# Patient Record
Sex: FEMALE | Race: AMERICAN INDIAN OR ALASKA NATIVE
[De-identification: names, ages, dates, MRNs, and addresses within clinical notes are randomized per-mention and may not be internally consistent; named-entity substitution may affect disease eponyms.]

---

## 2020-12-02 ENCOUNTER — HOSPITAL ENCOUNTER (EMERGENCY)
Dept: HOSPITAL 5 - ED | Age: 55
Discharge: HOME | End: 2020-12-02
Payer: SELF-PAY

## 2020-12-02 VITALS — DIASTOLIC BLOOD PRESSURE: 60 MMHG | SYSTOLIC BLOOD PRESSURE: 120 MMHG

## 2020-12-02 DIAGNOSIS — Z20.828: ICD-10-CM

## 2020-12-02 DIAGNOSIS — Z88.0: ICD-10-CM

## 2020-12-02 DIAGNOSIS — M17.0: ICD-10-CM

## 2020-12-02 DIAGNOSIS — Z79.899: ICD-10-CM

## 2020-12-02 DIAGNOSIS — J32.9: Primary | ICD-10-CM

## 2020-12-02 LAB
ALBUMIN SERPL-MCNC: 3.8 G/DL (ref 3.9–5)
ALT SERPL-CCNC: 19 UNITS/L (ref 7–56)
BACTERIA #/AREA URNS HPF: (no result) /HPF
BASOPHILS # (AUTO): 0.1 K/MM3 (ref 0–0.1)
BASOPHILS NFR BLD AUTO: 0.8 % (ref 0–1.8)
BILIRUB UR QL STRIP: (no result)
BLOOD UR QL VISUAL: (no result)
BUN SERPL-MCNC: 13 MG/DL (ref 7–17)
BUN/CREAT SERPL: 13 %
CALCIUM SERPL-MCNC: 9.4 MG/DL (ref 8.4–10.2)
EOSINOPHIL # BLD AUTO: 0 K/MM3 (ref 0–0.4)
EOSINOPHIL NFR BLD AUTO: 0.1 % (ref 0–4.3)
HCT VFR BLD CALC: 39.5 % (ref 30.3–42.9)
HEMOLYSIS INDEX: 49
HGB BLD-MCNC: 13.7 GM/DL (ref 10.1–14.3)
LYMPHOCYTES # BLD AUTO: 1.3 K/MM3 (ref 1.2–5.4)
LYMPHOCYTES NFR BLD AUTO: 16.4 % (ref 13.4–35)
MCHC RBC AUTO-ENTMCNC: 35 % (ref 30–34)
MCV RBC AUTO: 86 FL (ref 79–97)
MONOCYTES # (AUTO): 0.6 K/MM3 (ref 0–0.8)
MONOCYTES % (AUTO): 8.3 % (ref 0–7.3)
MUCOUS THREADS #/AREA URNS HPF: (no result) /HPF
PH UR STRIP: 5 [PH] (ref 5–7)
PLATELET # BLD: 196 K/MM3 (ref 140–440)
RBC # BLD AUTO: 4.59 M/MM3 (ref 3.65–5.03)
RBC #/AREA URNS HPF: 28 /HPF (ref 0–6)
UROBILINOGEN UR-MCNC: < 2 MG/DL (ref ?–2)
WBC #/AREA URNS HPF: 13 /HPF (ref 0–6)

## 2020-12-02 PROCEDURE — 87086 URINE CULTURE/COLONY COUNT: CPT

## 2020-12-02 PROCEDURE — 96374 THER/PROPH/DIAG INJ IV PUSH: CPT

## 2020-12-02 PROCEDURE — 99284 EMERGENCY DEPT VISIT MOD MDM: CPT

## 2020-12-02 PROCEDURE — 85025 COMPLETE CBC W/AUTO DIFF WBC: CPT

## 2020-12-02 PROCEDURE — 96361 HYDRATE IV INFUSION ADD-ON: CPT

## 2020-12-02 PROCEDURE — 36415 COLL VENOUS BLD VENIPUNCTURE: CPT

## 2020-12-02 PROCEDURE — 74022 RADEX COMPL AQT ABD SERIES: CPT

## 2020-12-02 PROCEDURE — 81001 URINALYSIS AUTO W/SCOPE: CPT

## 2020-12-02 PROCEDURE — 80053 COMPREHEN METABOLIC PANEL: CPT

## 2020-12-02 NOTE — EMERGENCY DEPARTMENT REPORT
- General


Chief Complaint: Nausea/Vomiting/Diarrhea


Stated Complaint: DRY COUGH, HEADACHE, CHILLS


Time Seen by Provider: 20 10:20


Source: patient


Mode of arrival: Ambulatory


Limitations: No Limitations





- History of Present Illness


Initial Comments: 





This is a 54-year-old female nontoxic, well nourished in appearance, no acute 

signs of distress presents to the ED with c/o of nonproductive cough,nausea, 

sinus headcahes, body aches, rhinorrhea, nasal congestion x several days.  

Patient denies any sick contacts.  Patient denies any recent travels, long car, 

recent hospital stays.  Patient denies any calf pain or calf tenderness.  

Patient denies any chest pain, short of breath, fever, chills, vomiting, 

hemoptysis, numbness, tingling, headache or stiff neck.  Allergies includes PCN.


MD Complaint: cough, rhinorrhea, nasal congestion, sinus pain, other (nasuea)


-: days(s)


Severity: mild


Severity scale (0 -10): 8


Quality: aching


Consistency: constant


Improves With: nothing


Worsens With: nothing


Associated Symptoms: rhinorrhea, nasal congestion, cough, nausea.  denies: 

fever, chills, myalgias, diaphoresis, headache, sore throat, stiff neck, chest 

pain, shortness of breath, abdominal pain, vomiting, diarrhea, dysuria, rash, 

confusion, right sweats, weight loss, epistaxis, hoarseness, ear pain


Treatments Prior to Arrival: none





- Related Data


                                  Previous Rx's











 Medication  Instructions  Recorded  Last Taken  Type


 


Aspirin [Aspirin TAB] 325 mg PO QDAY #30 tablet 16 1 Week Ago Rx





   ~10/18/16 


 


oxyCODONE /ACETAMINOPHEN [Percocet 1 tab PO Q4H PRN #60 tablet 16 1 Week 

Ago Rx





5/325 mg]   ~10/18/16 


 


Azithromycin [Zithromax Z-MALKA] 250 mg PO DAILY #6 tablet 20 Unknown Rx











                                    Allergies











Allergy/AdvReac Type Severity Reaction Status Date / Time


 


Penicillins AdvReac  Hives Verified 06/09/15 10:42














ED Review of Systems


ROS: 


Stated complaint: DRY COUGH, HEADACHE, CHILLS


Other details as noted in HPI





Constitutional: denies: chills, fever


Eyes: denies: eye pain, eye discharge, vision change


ENT: congestion.  denies: ear pain, throat pain


Respiratory: cough.  denies: shortness of breath, wheezing


Cardiovascular: denies: chest pain, palpitations


Endocrine: no symptoms reported


Gastrointestinal: nausea.  denies: abdominal pain, diarrhea


Genitourinary: denies: urgency, dysuria, discharge


Musculoskeletal: denies: back pain, joint swelling, arthralgia


Skin: denies: rash, lesions


Neurological: denies: headache, weakness, paresthesias


Psychiatric: denies: anxiety, depression


Hematological/Lymphatic: denies: easy bleeding, easy bruising





ED Past Medical Hx





- Past Medical History


Hx Hypertension: No


Hx Congestive Heart Failure: No


Hx Diabetes: No


Hx Deep Vein Thrombosis: No


Hx Arthritis: Yes (knees)


Hx Asthma: No


Hx COPD: No





- Surgical History


Hx Pacemaker: No


Hx Internal Defibrillator: No





- Social History


Smoking Status: Never Smoker


Substance Use Type: Alcohol





- Medications


Home Medications: 


                                Home Medications











 Medication  Instructions  Recorded  Confirmed  Last Taken  Type


 


Aspirin [Aspirin TAB] 325 mg PO QDAY #30 tablet 09/29/16 10/19/16 1 Week Ago Rx





    ~10/18/16 


 


oxyCODONE /ACETAMINOPHEN [Percocet 1 tab PO Q4H PRN #60 tablet 09/29/16 10/19/16

 1 Week Ago Rx





5/325 mg]    ~10/18/16 


 


Azithromycin [Zithromax Z-MALKA] 250 mg PO DAILY #6 tablet 20  Unknown Rx














ED Physical Exam





- General


Limitations: No Limitations


General appearance: alert, in no apparent distress





- Head


Head exam: Present: atraumatic, normocephalic





- Eye


Eye exam: Present: normal appearance





- Neck


Neck exam: Present: normal inspection, full ROM.  Absent: tenderness, m

eningismus, lymphadenopathy





- Respiratory


Respiratory exam: Present: normal lung sounds bilaterally.  Absent: respiratory 

distress, wheezes, rales, rhonchi, stridor, chest wall tenderness, accessory 

muscle use, decreased breath sounds, prolonged expiratory





- Cardiovascular


Cardiovascular Exam: Present: normal rhythm, tachycardia, normal heart sounds.  

Absent: irregular rhythm





- GI/Abdominal


GI/Abdominal exam: Present: soft, normal bowel sounds.  Absent: distended, 

tenderness, guarding, rebound, rigid, diminished bowel sounds





- Extremities Exam


Extremities exam: Present: normal inspection, full ROM





- Back Exam


Back exam: Present: normal inspection, full ROM.  Absent: tenderness, CVA 

tenderness (R), CVA tenderness (L), muscle spasm, paraspinal tenderness, 

vertebral tenderness, rash noted





- Neurological Exam


Neurological exam: Present: alert, oriented X3, normal gait





- Psychiatric


Psychiatric exam: Present: normal affect, normal mood





- Skin


Skin exam: Present: warm, dry, intact, normal color.  Absent: rash





- Other


Other exam information: 





Positive frontal sinus tenderness





ED Course


                                   Vital Signs











  20





  08:31 11:12 11:51


 


Temperature 98.8 F  


 


Pulse Rate 114 H  86


 


Respiratory 20 20 16





Rate   


 


Blood Pressure 116/55  


 


Blood Pressure   120/60





[Left]   


 


O2 Sat by Pulse 97  99





Oximetry   














- Reevaluation(s)


Reevaluation #1: 





20 11:34


Patient is speaking in full sentences with no signs of distress noted.





ED Medical Decision Making





- Lab Data


Result diagrams: 


                                 20 09:31





                                 20 09:31








                                   Lab Results











  20 Range/Units





  08:47 09:31 09:31 


 


WBC   7.8   (4.5-11.0)  K/mm3


 


RBC   4.59   (3.65-5.03)  M/mm3


 


Hgb   13.7   (10.1-14.3)  gm/dl


 


Hct   39.5   (30.3-42.9)  %


 


MCV   86   (79-97)  fl


 


MCH   30   (28-32)  pg


 


MCHC   35 H   (30-34)  %


 


RDW   15.1   (13.2-15.2)  %


 


Plt Count   196   (140-440)  K/mm3


 


Lymph % (Auto)   16.4   (13.4-35.0)  %


 


Mono % (Auto)   8.3 H   (0.0-7.3)  %


 


Eos % (Auto)   0.1   (0.0-4.3)  %


 


Baso % (Auto)   0.8   (0.0-1.8)  %


 


Lymph # (Auto)   1.3   (1.2-5.4)  K/mm3


 


Mono # (Auto)   0.6   (0.0-0.8)  K/mm3


 


Eos # (Auto)   0.0   (0.0-0.4)  K/mm3


 


Baso # (Auto)   0.1   (0.0-0.1)  K/mm3


 


Seg Neutrophils %   74.4 H   (40.0-70.0)  %


 


Seg Neutrophils #   5.8   (1.8-7.7)  K/mm3


 


Sodium    133 L  (137-145)  mmol/L


 


Potassium    4.6  (3.6-5.0)  mmol/L


 


Chloride    98.1  ()  mmol/L


 


Carbon Dioxide    26  (22-30)  mmol/L


 


Anion Gap    14  mmol/L


 


BUN    13  (7-17)  mg/dL


 


Creatinine    1.0  (0.6-1.2)  mg/dL


 


Estimated GFR    > 60  ml/min


 


BUN/Creatinine Ratio    13  %


 


Glucose    117 H  ()  mg/dL


 


Calcium    9.4  (8.4-10.2)  mg/dL


 


Total Bilirubin    0.30  (0.1-1.2)  mg/dL


 


AST    24  (5-40)  units/L


 


ALT    19  (7-56)  units/L


 


Alkaline Phosphatase    54  ()  units/L


 


Total Protein    7.7  (6.3-8.2)  g/dL


 


Albumin    3.8 L  (3.9-5)  g/dL


 


Albumin/Globulin Ratio    1.0  %


 


Urine Color  Yellow    (Yellow)  


 


Urine Turbidity  Slightly-cloudy    (Clear)  


 


Urine pH  5.0    (5.0-7.0)  


 


Ur Specific Gravity  1.017    (1.003-1.030)  


 


Urine Protein  30 mg/dl    (Negative)  mg/dL


 


Urine Glucose (UA)  Neg    (Negative)  mg/dL


 


Urine Ketones  Neg    (Negative)  mg/dL


 


Urine Blood  Lg    (Negative)  


 


Urine Nitrite  Neg    (Negative)  


 


Urine Bilirubin  Neg    (Negative)  


 


Urine Urobilinogen  < 2.0    (<2.0)  mg/dL


 


Ur Leukocyte Esterase  Tr    (Negative)  


 


Urine WBC (Auto)  13.0 H    (0.0-6.0)  /HPF


 


Urine RBC (Auto)  28.0    (0.0-6.0)  /HPF


 


U Epithel Cells (Auto)  24.0 H    (0-13.0)  /HPF


 


Urine Bacteria (Auto)  1+    (Negative)  /HPF


 


Urine Mucus  Few    /HPF


 


Urine Yeast (Budding)  Few    /HPF














- Radiology Data











Referring Physician: PHIL TRIPP


 


Patient Name: JOVANA CHAVEZ


 


Patient ID: [id number]


 


YOB: 1965


 


Sex: Female


 


Accession: [id number]


 


Report Date: 2020


 


Report Status: Finalized








[facility] [address] [place] [address] 

XRay Report Signed Patient: JOVANA CHAVEZ MR#: [id number] : 1965

 Acct:[id number] Age/Sex: 54 / F ADM Date: 20 Loc: ED Attending Dr: 

Ordering Physician: PHIL TRIPP NP Date of Service: 20 Procedure(s): XR

 abd series w cxr 1V Accession Number(s): [id number] cc: PHIL TRIPP NP Fluoro 

Time In Minutes: . ABDOMEN 3 VIEW(S) INDICATION / CLINICAL INFORMATION: co

ugh/n/v. COMPARISON: None available. FINDINGS: TUBES / LINES: None. BOWEL GAS 

PATTERN: No significant abnormality. FREE AIR / EXTRALUMINAL GAS: None seen. 

ADDITIONAL FINDINGS: No significant additional findings. LUNGS: Visualized lungs

 show no significant abnormality. IMPRESSION: 1. No significant abnormality. 

Signer Name: Adolph Mcmahon MD Signed: 2020 11:06 AM Workstation Name: [id number]

[employer] Transcribed By: ISIDRA Dictated By: Adolph Mcmahon MD Electronically 

Authenticated By: Adolph Mcmahon MD Signed Date/Time: 20 1106 DD/DT: 

20 1105 TD/TT: 





- Medical Decision Making





This is a 54-year-old female that presents with suspected COVID and sinusitis.  

Patient is stable and was examined by me.  Chest x-ray has been obtained and 

dictated by radiologist with normal exam.  Patient is notified of x-ray results 

with no questions noted. Patient was instructed and educated on signs and 

symptoms and to self quarantine of COVID and seek medical attention as soon as 

possible if symptoms worse.  I will treat patient empirically with zpak.   

Patient was instructed to increase hydration, rest and take Tylenol for fever 

episodes.  Patient received Tylneol and fluids in the ED.  Vitals stable. 

Patient is nonfebrile and normal heart rate. Patient was instructed Follow-up 

with a primary care doctor in 3-5 days or if symptoms worsen and continue return

 to emergency room as soon as possible.  At time time of discharge, the patient 

does not seem toxic or ill in appearance.  No acute signs of distress noted.  

Patient agrees to discharge treatment plan of care.  No further questions noted 

by the patient.nt.


Critical care attestation.: 


If time is entered above; I have spent that time in minutes in the direct care 

of this critically ill patient, excluding procedure time.








ED Disposition


Clinical Impression: 


 Suspected COVID-19 virus infection





Sinusitis


Qualifiers:


 Sinusitis location: frontal Chronicity: acute Recurrence: non-recurrent 

Qualified Code(s): J01.10 - Acute frontal sinusitis, unspecified





Disposition: DC- TO HOME OR SELFCARE


Is pt being admited?: No


Does the pt Need Aspirin: No


Condition: Stable


Instructions:  COVID-19 Frequently Asked Questions, COVID-19, Sinusitis, Adult, 

Easy-to-Read, COVID-19: How to Protect Yourself and Others - [facility], COVID-19


Additional Instructions: 


Follow-up with a primary care doctor in 3-5 days or if symptoms worsen and 

continue return to emergency room as soon as possible. 





As educated and instructed to you must self quarantine yourself and people that 

you have been in close contact with similar symptoms for the next 14 days.





Please see your nearest health department or primary care doctor that you are 

referred to for COVID testing.





Increased rest, hydration, and take Tylenol as prescribed for fever episode.


Prescriptions: 


Azithromycin [Zithromax Z-MALKA] 250 mg PO DAILY #6 tablet


Referrals: 


PRIMARY MD JONN [Primary Care Provider] - 3-5 Days


LUKAS BRASHER MD [Staff Physician] - 3-5 Days


Forms:  Work/School Release Form(ED)


Time of Disposition: 11:40

## 2020-12-02 NOTE — XRAY REPORT
. ABDOMEN 3 VIEW(S)



INDICATION / CLINICAL INFORMATION:

cough/n/v.



COMPARISON: 

None available.



FINDINGS:



TUBES / LINES: None.

BOWEL GAS PATTERN: No significant abnormality. 

FREE AIR / EXTRALUMINAL GAS: None seen.



ADDITIONAL FINDINGS: No significant additional findings.



LUNGS: Visualized lungs show no significant abnormality.



IMPRESSION:

1. No significant abnormality.



Signer Name: Adolph Mcmahon MD 

Signed: 12/2/2020 11:06 AM

Workstation Name: [id number]

## 2022-08-17 ENCOUNTER — HOSPITAL ENCOUNTER (EMERGENCY)
Dept: HOSPITAL 5 - ED | Age: 57
LOS: 1 days | Discharge: HOME | End: 2022-08-18
Payer: MEDICARE

## 2022-08-17 VITALS — DIASTOLIC BLOOD PRESSURE: 88 MMHG | SYSTOLIC BLOOD PRESSURE: 151 MMHG

## 2022-08-17 DIAGNOSIS — R07.9: Primary | ICD-10-CM

## 2022-08-17 DIAGNOSIS — Z88.0: ICD-10-CM

## 2022-08-17 DIAGNOSIS — F10.20: ICD-10-CM

## 2022-08-17 PROCEDURE — 93005 ELECTROCARDIOGRAM TRACING: CPT

## 2022-08-17 PROCEDURE — 71046 X-RAY EXAM CHEST 2 VIEWS: CPT

## 2022-08-17 PROCEDURE — 80048 BASIC METABOLIC PNL TOTAL CA: CPT

## 2022-08-17 PROCEDURE — 85025 COMPLETE CBC W/AUTO DIFF WBC: CPT

## 2022-08-17 PROCEDURE — 84443 ASSAY THYROID STIM HORMONE: CPT

## 2022-08-17 PROCEDURE — 99283 EMERGENCY DEPT VISIT LOW MDM: CPT

## 2022-08-17 PROCEDURE — 36415 COLL VENOUS BLD VENIPUNCTURE: CPT

## 2022-08-17 NOTE — XRAY REPORT
CHEST 2 VIEWS 



INDICATION: 

CHEST DISCOMFORT.



COMPARISON: 

12/2/2020



FINDINGS:



SUPPORT DEVICES: None.



HEART: Within normal limits. 



LUNGS/PLEURA: No acute air space or interstitial disease.  No pneumothorax.



ADDITIONAL FINDINGS: None.







IMPRESSION:

1. No acute findings.



Signer Name: Seferino Navarro MD 

Signed: 8/17/2022 9:18 PM

Workstation Name: [id number]

## 2022-08-18 LAB
BASOPHILS # (AUTO): 0.1 K/MM3 (ref 0–0.1)
BASOPHILS NFR BLD AUTO: 0.6 % (ref 0–1.8)
BUN SERPL-MCNC: 17 MG/DL (ref 7–17)
BUN/CREAT SERPL: 17 %
CALCIUM SERPL-MCNC: 9.6 MG/DL (ref 8.4–10.2)
EOSINOPHIL # BLD AUTO: 0.2 K/MM3 (ref 0–0.4)
EOSINOPHIL NFR BLD AUTO: 1.9 % (ref 0–4.3)
HCT VFR BLD CALC: 41.8 % (ref 30.3–42.9)
HEMOLYSIS INDEX: 13
HGB BLD-MCNC: 13.8 GM/DL (ref 10.1–14.3)
LYMPHOCYTES # BLD AUTO: 2.7 K/MM3 (ref 1.2–5.4)
LYMPHOCYTES NFR BLD AUTO: 27.6 % (ref 13.4–35)
MCHC RBC AUTO-ENTMCNC: 33 % (ref 30–34)
MCV RBC AUTO: 87 FL (ref 79–97)
MONOCYTES # (AUTO): 0.6 K/MM3 (ref 0–0.8)
MONOCYTES % (AUTO): 5.8 % (ref 0–7.3)
PLATELET # BLD: 259 K/MM3 (ref 140–440)
RBC # BLD AUTO: 4.79 M/MM3 (ref 3.65–5.03)

## 2022-08-18 NOTE — EMERGENCY DEPARTMENT REPORT
ED Palpitations HPI





- General


Chief Complaint: Chest Pain


Stated Complaint: HEART BEATING FAST


Time Seen by Provider: 08/18/22 01:49


Source: patient


Mode of arrival: Ambulatory


Limitations: No Limitations





- History of Present Illness


MD Complaint: rapid heart beat, palpitations


-: Gradual


Associated Symptoms: denies: shortness of breath, near-syncope, nausea/vomiting,

anxiety, diaphoresis, cough, parasthesias, feeling of impending doom





- Related Data


                                  Previous Rx's











 Medication  Instructions  Recorded  Last Taken  Type


 


Aspirin [Aspirin TAB] 325 mg PO QDAY #30 tablet 09/29/16 1 Week Ago Rx





   ~10/18/16 


 


oxyCODONE /ACETAMINOPHEN [Percocet 1 tab PO Q4H PRN #60 tablet 09/29/16 1 Week 

Ago Rx





5/325 mg]   ~10/18/16 


 


Azithromycin [Zithromax Z-MALKA] 250 mg PO DAILY #6 tablet 12/02/20 Unknown Rx











                                    Allergies











Allergy/AdvReac Type Severity Reaction Status Date / Time


 


Penicillins AdvReac  Hives Verified 06/09/15 10:42














ED Review of Systems


ROS: 


Stated complaint: HEART BEATING FAST


Other details as noted in HPI





Comment: All other systems reviewed and negative





ED Past Medical Hx





- Past Medical History


Hx Hypertension: No


Hx Congestive Heart Failure: No


Hx Diabetes: No


Hx Deep Vein Thrombosis: No


Hx Arthritis: Yes (knees)


Hx Asthma: No


Hx COPD: No





- Surgical History


Hx Pacemaker: No


Hx Internal Defibrillator: No





- Social History


Smoking Status: Never Smoker


Substance Use Type: Alcohol





- Medications


Home Medications: 


                                Home Medications











 Medication  Instructions  Recorded  Confirmed  Last Taken  Type


 


Aspirin [Aspirin TAB] 325 mg PO QDAY #30 tablet 09/29/16 10/19/16 1 Week Ago Rx





    ~10/18/16 


 


oxyCODONE /ACETAMINOPHEN [Percocet 1 tab PO Q4H PRN #60 tablet 09/29/16 10/19/16

 1 Week Ago Rx





5/325 mg]    ~10/18/16 


 


Azithromycin [Zithromax Z-MALKA] 250 mg PO DAILY #6 tablet 12/02/20  Unknown Rx














ED Physical Exam





- General


Limitations: No Limitations


General appearance: alert, in no apparent distress





- Head


Head exam: Present: atraumatic, normocephalic





- Eye


Eye exam: Present: normal appearance, EOMI


Pupils: Present: normal accommodation





- ENT


ENT exam: Present: mucous membranes moist





- Neck


Neck exam: Present: normal inspection





- Respiratory


Respiratory exam: Present: normal lung sounds bilaterally.  Absent: respiratory 

distress





- Cardiovascular


Cardiovascular Exam: Present: regular rate, normal rhythm.  Absent: systolic 

murmur, diastolic murmur, rubs, gallop





- GI/Abdominal


GI/Abdominal exam: Present: soft, normal bowel sounds





- Extremities Exam


Extremities exam: Present: normal inspection





- Back Exam


Back exam: Present: normal inspection





- Neurological Exam


Neurological exam: Present: alert, oriented X3





- Psychiatric


Psychiatric exam: Present: normal affect, normal mood





- Skin


Skin exam: Present: warm, dry, intact, normal color.  Absent: rash





ED Course





                                   Vital Signs











  08/17/22





  20:55


 


Temperature 98.2 F


 


Pulse Rate 123 H


 


Respiratory 18





Rate 


 


Blood Pressure 151/88


 


O2 Sat by Pulse 99





Oximetry 














ED Medical Decision Making





- Lab Data


Result diagrams: 


                                 08/18/22 01:56





                                 08/18/22 01:56


Critical care attestation.: 


If time is entered above; I have spent that time in minutes in the direct care 

of this critically ill patient, excluding procedure time.








ED Disposition


Condition: Stable


Referrals: 


LUKAS BRASHER MD [Primary Care Provider] - 3-5 Days

## 2022-08-19 NOTE — ELECTROCARDIOGRAPH REPORT
[facility]

                                       

Test Date:    2022               Test Time:    20:57:54

Pat Name:     JOVANA CHAVEZ             Department:   

Patient ID:   [id number]          Room:          

Gender:       F                        Technician:   CHARLY

:          1965               Requested By: KRIS SAENZ

Order Number: [id number]             Reading MD:   Victoriano Yang

                                 Measurements

Intervals                              Axis          

Rate:         109                      P:            57

NC:           175                      QRS:          -46

QRSD:         73                       T:            48

QT:           325                                    

QTc:          437                                    

                           Interpretive Statements

Sinus tachycardia

Left anterior fascicular block

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

No previous ECG available for comparison

Electronically Signed On 2022 21:47:03 EDT by Victoriano Yang